# Patient Record
Sex: FEMALE | Race: WHITE | NOT HISPANIC OR LATINO | URBAN - METROPOLITAN AREA
[De-identification: names, ages, dates, MRNs, and addresses within clinical notes are randomized per-mention and may not be internally consistent; named-entity substitution may affect disease eponyms.]

---

## 2017-07-21 ENCOUNTER — OFFICE (OUTPATIENT)
Dept: URBAN - METROPOLITAN AREA CLINIC 11 | Facility: CLINIC | Age: 77
End: 2017-07-21
Payer: MEDICAID

## 2017-07-21 VITALS
HEIGHT: 65 IN | DIASTOLIC BLOOD PRESSURE: 73 MMHG | WEIGHT: 99 LBS | HEART RATE: 46 BPM | SYSTOLIC BLOOD PRESSURE: 129 MMHG

## 2017-07-21 DIAGNOSIS — R63.4 ABNORMAL WEIGHT LOSS: ICD-10-CM

## 2017-07-21 DIAGNOSIS — R13.19 OTHER DYSPHAGIA: ICD-10-CM

## 2017-07-21 DIAGNOSIS — R19.7 DIARRHEA, UNSPECIFIED: ICD-10-CM

## 2017-07-21 DIAGNOSIS — Z86.010 PERSONAL HISTORY OF COLONIC POLYPS: ICD-10-CM

## 2017-07-21 LAB
CBC COMPLETE BLOOD COUNT W/O DIFF: HEMATOCRIT: 35.8 % — LOW (ref 36–48)
CBC COMPLETE BLOOD COUNT W/O DIFF: HEMOGLOBIN: 11.9 G/DL — LOW (ref 12–16)
CBC COMPLETE BLOOD COUNT W/O DIFF: MCH: 29.5 PG (ref 25–35)
CBC COMPLETE BLOOD COUNT W/O DIFF: MCHC: 33.2 % (ref 30–38)
CBC COMPLETE BLOOD COUNT W/O DIFF: MCV: 88.8 FL (ref 78–102)
CBC COMPLETE BLOOD COUNT W/O DIFF: PLATELET COUNT: 214 K/UL (ref 150–450)
CBC COMPLETE BLOOD COUNT W/O DIFF: RBC DISTRIBUTION WIDTH: 14.9 % (ref 11.5–16)
CBC COMPLETE BLOOD COUNT W/O DIFF: RED BLOOD CELL COUNT: 4.03 M/UL (ref 4–5.5)
CBC COMPLETE BLOOD COUNT W/O DIFF: WHITE BLOOD CELL COUNT: 4 K/UL (ref 4–11)
COMPREHENSIVE METABOLIC PANEL: ALBUMIN: 4.9 G/DL (ref 3.5–5.2)
COMPREHENSIVE METABOLIC PANEL: ALKALINE PHOSPHATASE: 54 U/L (ref 38–146)
COMPREHENSIVE METABOLIC PANEL: CALCIUM TOTAL: 10.9 MG/DL — HIGH (ref 8.5–10.5)
COMPREHENSIVE METABOLIC PANEL: CARBON DIOXIDE: 26 MEQ/L (ref 21–31)
COMPREHENSIVE METABOLIC PANEL: CHLORIDE: 97 MEQ/L (ref 96–106)
COMPREHENSIVE METABOLIC PANEL: CREATININE: 1.04 MG/DL (ref 0.6–1.3)
COMPREHENSIVE METABOLIC PANEL: FASTING/NON-FASTING: (no result)
COMPREHENSIVE METABOLIC PANEL: GLUCOSE: 95 MG/DL (ref 65–100)
COMPREHENSIVE METABOLIC PANEL: POTASSIUM: 4.2 MEQ/ML (ref 3.5–5.4)
COMPREHENSIVE METABOLIC PANEL: SGOT (AST): 26 U/L (ref 13–40)
COMPREHENSIVE METABOLIC PANEL: SGPT (ALT): 14 U/L (ref 7–52)
COMPREHENSIVE METABOLIC PANEL: SODIUM: 140 MEQ/L (ref 135–145)
COMPREHENSIVE METABOLIC PANEL: TOTAL BILIRUBIN: 0.9 MG/DL (ref 0.3–1.2)
COMPREHENSIVE METABOLIC PANEL: TOTAL PROTEIN: 7.5 G/DL (ref 6.4–8.3)
COMPREHENSIVE METABOLIC PANEL: UREA NITROGEN: 16 MG/DL (ref 8–23)

## 2017-07-21 PROCEDURE — 99204 OFFICE O/P NEW MOD 45 MIN: CPT | Performed by: INTERNAL MEDICINE

## 2017-07-21 PROCEDURE — 36415 COLL VENOUS BLD VENIPUNCTURE: CPT | Performed by: INTERNAL MEDICINE

## 2017-07-21 PROCEDURE — G8427 DOCREV CUR MEDS BY ELIG CLIN: HCPCS | Performed by: INTERNAL MEDICINE

## 2017-07-21 RX ORDER — SODIUM SULFATE, POTASSIUM SULFATE, MAGNESIUM SULFATE 17.5; 3.13; 1.6 G/ML; G/ML; G/ML
SOLUTION, CONCENTRATE ORAL
Qty: 1 | Refills: 0 | Status: ACTIVE
Start: 2017-07-21

## 2017-07-21 NOTE — SERVICENOTES
We discussed her chronic diarrhea and dysphagia issues.  Her history is a bit confusing but it appears that the diarrhea has been an issue for probably about a decade.  With the reports of polyps and the chronic diarrhea we can do the colonoscopy with random bx.  I would also do the EGD Re dysphagia and for small bowel bx to r/o celiac disease.  With her weight loss, I will add the labs and stools for fecal fat.  It is not clear what surgery she has had but the scars on the chest are consistent with an esophageal procedure.  She has a small abdominal scar that she stated was from a hysterectomy and would not have been consistent with a partial colectomy for an interposition procedure.

## 2017-07-21 NOTE — SERVICEHPINOTES
"My bowels don't move like bowels, They are like infection."  She stated taht it has been an issue fo a couple of weeks but her family stated that it has been about 6 months. The stools have been "yellow slimy water with yellow flakes of flush".  She has not had blood inher stools.  Shehas had some nocturnal stools and has soiled the bed.  She has an intermittent diarrhea and it seems to be more at night.  Her last stool was about a day and a half ago.  He has not noted any particular food that makes it worse.  She has tried Imodium but was taking 1/2 tablet without improvemnt.  She has lost about 30lbs over the past 6 months or so.  She has noted that her appetite has decreased.  She is not eating much and is mostly eating pies with coffee. As her history unfolded, she has actually had diarrhea issues for several years.  She reported having an EGD/colon in about 2010 at [facility] and then a colonoscopy about 4 years ago in [place]. Her family stated that she has had colon polyps and was to have a f/u colon in three years and that she was overdue.  They did not recall who did her last colonoscopy. She has been spitting back her food for several years.  She related this to having "esophagus surgery" years ago.  Her family though that they took out her esophagus in [place] over 20 years ago. She and her family were not sure why this was done other than she could not eat. .